# Patient Record
Sex: MALE | Race: OTHER | URBAN - METROPOLITAN AREA
[De-identification: names, ages, dates, MRNs, and addresses within clinical notes are randomized per-mention and may not be internally consistent; named-entity substitution may affect disease eponyms.]

---

## 2019-06-06 ENCOUNTER — OFFICE VISIT (OUTPATIENT)
Dept: FAMILY MEDICINE CLINIC | Age: 39
End: 2019-06-06

## 2019-06-06 VITALS
DIASTOLIC BLOOD PRESSURE: 80 MMHG | WEIGHT: 130 LBS | HEIGHT: 62 IN | BODY MASS INDEX: 23.92 KG/M2 | TEMPERATURE: 98.1 F | SYSTOLIC BLOOD PRESSURE: 128 MMHG | HEART RATE: 54 BPM

## 2019-06-06 DIAGNOSIS — K29.70 GASTRITIS WITHOUT BLEEDING, UNSPECIFIED CHRONICITY, UNSPECIFIED GASTRITIS TYPE: Primary | ICD-10-CM

## 2019-06-06 RX ORDER — OMEPRAZOLE 40 MG/1
40 CAPSULE, DELAYED RELEASE ORAL DAILY
Qty: 30 CAP | Refills: 3 | Status: SHIPPED | OUTPATIENT
Start: 2019-06-06

## 2019-06-06 NOTE — PROGRESS NOTES
The following was performed at discharge station per provider with the assistance of , Devante Peters:    AVS printed, reviewed with pt and given to pt. RN reviewed new medication, Omeprazole with pt, and gave him Good Rx card, advising him that [employer] sells this medication for $9.00. RN also gave pt container for H. Pylori stool test and he was told to bring the stool sample to the clinic within 2 hours of collection. Pt expressed understanding of the above information. Cristina Kaur.

## 2019-06-06 NOTE — PROGRESS NOTES
HISTORY OF PRESENT ILLNESS  Joanie Valentin is a 43 y.o. male. HPI  Patient states his abdomen becomes distended at night, also having some pain. Also has  Noticed some heartburn, worse when he eats greasy food. Denies diarrhea. Has felt this way for the last 2 months. Has avoided eating spicy foods. Review of Systems   Constitutional: Negative for chills, fever, malaise/fatigue and weight loss. Respiratory: Negative for cough, shortness of breath and wheezing. Cardiovascular: Negative for chest pain, palpitations, orthopnea, claudication and leg swelling. Gastrointestinal: Positive for heartburn. Negative for abdominal pain, blood in stool, constipation, diarrhea, nausea and vomiting. Genitourinary: Negative for dysuria, frequency, hematuria and urgency. Musculoskeletal: Negative for back pain, joint pain, myalgias and neck pain. Neurological: Negative for dizziness, tingling, tremors and headaches. /80 (BP 1 Location: Right arm, BP Patient Position: Sitting)   Pulse (!) 54   Temp 98.1 °F (36.7 °C) (Oral)   Ht 5' 2.01\" (1.575 m)   Wt 130 lb (59 kg)   BMI 23.77 kg/m²   Physical Exam   Constitutional: He is oriented to person, place, and time. He appears well-developed and well-nourished. HENT:   Head: Normocephalic. Right Ear: External ear normal.   Left Ear: External ear normal.   Eyes: Pupils are equal, round, and reactive to light. Conjunctivae and EOM are normal.   Neck: Normal range of motion. Neck supple. No thyromegaly present. Cardiovascular: Normal rate, regular rhythm and normal heart sounds. No murmur heard. Pulmonary/Chest: Effort normal and breath sounds normal. No respiratory distress. He has no wheezes. He has no rales. Abdominal: Soft. Bowel sounds are normal. He exhibits no distension. There is no tenderness. There is no rebound. Musculoskeletal: Normal range of motion. He exhibits no edema.    Neurological: He is alert and oriented to person, place, and time. He has normal reflexes. No cranial nerve deficit. Skin: Skin is warm. No rash noted. No erythema. ASSESSMENT and PLAN  Diagnoses and all orders for this visit:    1. Gastritis without bleeding, unspecified chronicity, unspecified gastritis type  -     omeprazole (PRILOSEC) 40 mg capsule; Take 1 Cap by mouth daily.  -     H PYLORI AG, STOOL; Future      Dietary changes recommended, avoid spicy foods, avoid caffeine, may use omeprazole as needed  Will check for h.  Pylori  Follow up as needed

## 2019-06-20 ENCOUNTER — CLINICAL SUPPORT (OUTPATIENT)
Dept: FAMILY MEDICINE CLINIC | Age: 39
End: 2019-06-20

## 2019-06-20 DIAGNOSIS — Z71.9 COUNSELED BY NURSE: Primary | ICD-10-CM

## 2019-06-27 ENCOUNTER — HOSPITAL ENCOUNTER (OUTPATIENT)
Dept: LAB | Age: 39
Discharge: HOME | End: 2019-06-27

## 2019-06-27 ENCOUNTER — LAB ONLY (OUTPATIENT)
Dept: FAMILY MEDICINE CLINIC | Age: 39
End: 2019-06-27

## 2019-06-27 DIAGNOSIS — K29.70 GASTRITIS WITHOUT BLEEDING, UNSPECIFIED CHRONICITY, UNSPECIFIED GASTRITIS TYPE: ICD-10-CM

## 2019-06-27 PROCEDURE — 87338 HPYLORI STOOL AG IA: CPT

## 2019-06-27 NOTE — PROGRESS NOTES
Statement below are documented by Radha Root MA, patient here for labs only, patient dropped of a stool sample for H Pylori . Patient was advise with the help of Sofia Esparza as the , that a Dr or Nurse will call with the results if anything is abnormal, if everything is normal no phone call will be done but they can also wait till the next visit to discuss it with the Dr. Christy Olivares, Medical Assistant.

## 2019-06-30 LAB
H PYLORI AG STL QL IA: NEGATIVE
SPECIMEN SOURCE: NORMAL

## 2019-07-01 ENCOUNTER — TELEPHONE (OUTPATIENT)
Dept: FAMILY MEDICINE CLINIC | Age: 39
End: 2019-07-01

## 2021-09-25 NOTE — PROGRESS NOTES
Patient needs supplies for stool specimen, per patient he brought the specimen last thrusday at [facility] but the [facility] was not at this locations. Chart reviewed. H-pillory stool collection kit and instructions given. Calendar with [facility] locations given. Patient plans on bringing specimen next week. This has been fully explained to the patient, who indicates understanding and agrees with plan. No further questions at this time.
25-Sep-2021